# Patient Record
Sex: MALE | Race: BLACK OR AFRICAN AMERICAN | NOT HISPANIC OR LATINO | URBAN - METROPOLITAN AREA
[De-identification: names, ages, dates, MRNs, and addresses within clinical notes are randomized per-mention and may not be internally consistent; named-entity substitution may affect disease eponyms.]

---

## 2022-06-13 ENCOUNTER — OFFICE VISIT (OUTPATIENT)
Dept: URBAN - METROPOLITAN AREA CLINIC 105 | Facility: CLINIC | Age: 37
End: 2022-06-13

## 2024-01-11 ENCOUNTER — LAB OUTSIDE AN ENCOUNTER (OUTPATIENT)
Dept: URBAN - METROPOLITAN AREA CLINIC 40 | Facility: CLINIC | Age: 39
End: 2024-01-11

## 2024-01-11 ENCOUNTER — OFFICE VISIT (OUTPATIENT)
Dept: URBAN - METROPOLITAN AREA CLINIC 40 | Facility: CLINIC | Age: 39
End: 2024-01-11
Payer: COMMERCIAL

## 2024-01-11 DIAGNOSIS — K59.01 CONSTIPATION: ICD-10-CM

## 2024-01-11 PROCEDURE — 99203 OFFICE O/P NEW LOW 30 MIN: CPT | Performed by: PHYSICIAN ASSISTANT

## 2024-01-11 RX ORDER — POLYETHYLENE GLYCOL 3350, SODIUM SULFATE ANHYDROUS, SODIUM BICARBONATE, SODIUM CHLORIDE, POTASSIUM CHLORIDE 236; 22.74; 6.74; 5.86; 2.97 G/4L; G/4L; G/4L; G/4L; G/4L
ML AS DIRECTED POWDER, FOR SOLUTION ORAL ONCE
Qty: 1 | Refills: 0 | OUTPATIENT
Start: 2024-01-11 | End: 2024-01-12

## 2024-01-11 NOTE — HPI-TODAY'S VISIT:
Mr. Goncalves is a 38-year-old black male who presents to the office today to discuss constipation which onset after starting initially tramadol and then oxycodone for recent left foot Ortho injury.  He states that he did not take oxycodone long-acting he noticed that he is continue to have severe constipation.  He tried multiple therapies reportedly over-the-counter drugs including stool softeners, enemas and laxatives which gave minimal improvement.  He denies any significant abdominal pain, no rectal bleeding.  There is no nausea or vomiting reported.  Denies any other change in diet.  No family history given of colorectal cancer.  Denies any change in urinary pattern or stream.  He had no significant constipation prior to taking pain medication prescribed months ago for a left foot injury.  No recent imaging reported.  He states that he feels that he has had some small bowel movements with over-the-counter therapies but still believes that there is pressure in the lower rectum.  No rectal pain reported.  He maintains he is not taking any pain medication for several weeks.

## 2024-01-18 ENCOUNTER — OFFICE VISIT (OUTPATIENT)
Dept: URBAN - METROPOLITAN AREA CLINIC 40 | Facility: CLINIC | Age: 39
End: 2024-01-18

## 2024-01-18 NOTE — HPI-TODAY'S VISIT:
Mr. Goncalves is a 38-year-old black male who presents to the office today to discuss constipation which onset after starting initially tramadol and then oxycodone for recent left foot Ortho injury.  He states that he did not take oxycodone long-acting he noticed that he is continue to have severe constipation.  He tried multiple therapies reportedly over-the-counter drugs including stool softeners, enemas and laxatives which gave minimal improvement.  He denies any significant abdominal pain, no rectal bleeding.  There is no nausea or vomiting reported.  Denies any other change in diet.  No family history given of colorectal cancer.  Denies any change in urinary pattern or stream.  He had no significant constipation prior to taking pain medication prescribed months ago for a left foot injury.  No recent imaging reported.  He states that he feels that he has had some small bowel movements with over-the-counter therapies but still believes that there is pressure in the lower rectum.  No rectal pain reported.  He maintains he is not taking any pain medication for several weeks. Moderate stool burden, no evidence of bowel obstruction on Abd XR 1/11/24.

## 2024-01-20 ENCOUNTER — WEB ENCOUNTER (OUTPATIENT)
Dept: URBAN - METROPOLITAN AREA CLINIC 40 | Facility: CLINIC | Age: 39
End: 2024-01-20

## 2024-01-22 ENCOUNTER — WEB ENCOUNTER (OUTPATIENT)
Dept: URBAN - METROPOLITAN AREA CLINIC 40 | Facility: CLINIC | Age: 39
End: 2024-01-22

## 2024-01-24 ENCOUNTER — OFFICE VISIT (OUTPATIENT)
Dept: URBAN - METROPOLITAN AREA CLINIC 40 | Facility: CLINIC | Age: 39
End: 2024-01-24
Payer: COMMERCIAL

## 2024-01-24 VITALS
WEIGHT: 194.2 LBS | HEART RATE: 64 BPM | TEMPERATURE: 97.3 F | SYSTOLIC BLOOD PRESSURE: 122 MMHG | DIASTOLIC BLOOD PRESSURE: 72 MMHG | BODY MASS INDEX: 28.76 KG/M2 | HEIGHT: 69 IN

## 2024-01-24 DIAGNOSIS — K60.2 ANAL FISSURE: ICD-10-CM

## 2024-01-24 DIAGNOSIS — K62.89 RECTAL PAIN: ICD-10-CM

## 2024-01-24 DIAGNOSIS — K59.01 CONSTIPATION: ICD-10-CM

## 2024-01-24 PROCEDURE — 99214 OFFICE O/P EST MOD 30 MIN: CPT | Performed by: PHYSICIAN ASSISTANT

## 2024-01-24 NOTE — HPI-TODAY'S VISIT:
Mr. Goncalves is a 38-year-old black male who was seen for initial visit 1/11/24 to discuss constipation which onset after starting initially tramadol and then oxycodone for recent left foot Ortho injury.  He states that he did not take oxycodone long-acting he noticed that he is continue to have severe constipation.  He tried multiple therapies reportedly over-the-counter drugs including stool softeners, enemas and laxatives which gave minimal improvement.  He denies any significant abdominal pain, no rectal bleeding.  There is no nausea or vomiting reported.  Denies any other change in diet.  No family history given of colorectal cancer.  Denies any change in urinary pattern or stream.  He had no significant constipation prior to taking pain medication prescribed months ago for a left foot injury.  No recent imaging reported.  He states that he feels that he has had some small bowel movements with over-the-counter therapies but still believes that there is pressure in the lower rectum.  No rectal pain reported.  He maintains he is not taking any pain medication for several weeks. Moderate stool burden, no evidence of bowel obstruction on Abd XR 1/11/24. The patient returns to the office today stating he is having now more frequent and more baseline bowel movements without any rectal bleeding.  However, he is worried about pressure and "pain" in the rectal area following bowel movements which are generally painless as he evacuate stool.  Recently seen at [facility] on January 21, 2024 had an abdominal x-ray which showed no significant stool in the colon, no masses or calcifications and no dilated loops of bowel.  No acute osseous findings.  He reportedly had a rectal exam where he was told he likely had hemorrhoids and given hydrocortisone cream.  He has been using this for 3 days with only minimal improvement.  He has no external lesions which she can palpate.  No fever or chills, no vomiting or abdominal pain reported.  Appetite is normal and weight stable.  Denies any changes in urine stream.

## 2024-01-24 NOTE — PHYSICAL EXAM RECTAL:
(Dixie, chaperone in room ) : normal tone, no external hemorrhoids, no masses palpable, no melena, no red blood, +posterior anal fissure palpated, visualized.

## 2024-01-30 ENCOUNTER — WEB ENCOUNTER (OUTPATIENT)
Dept: URBAN - METROPOLITAN AREA CLINIC 40 | Facility: CLINIC | Age: 39
End: 2024-01-30

## 2024-02-15 ENCOUNTER — OV EP (OUTPATIENT)
Dept: URBAN - METROPOLITAN AREA CLINIC 40 | Facility: CLINIC | Age: 39
End: 2024-02-15